# Patient Record
Sex: MALE | Race: WHITE | NOT HISPANIC OR LATINO | Employment: OTHER | URBAN - METROPOLITAN AREA
[De-identification: names, ages, dates, MRNs, and addresses within clinical notes are randomized per-mention and may not be internally consistent; named-entity substitution may affect disease eponyms.]

---

## 2020-08-15 PROBLEM — S82.839A: Status: ACTIVE | Noted: 2020-08-15
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
## 2020-08-15 PROBLEM — S82.831A CLOSED FRACTURE OF RIGHT DISTAL FIBULA: Status: ACTIVE | Noted: 2020-08-15

## 2020-08-16 PROBLEM — S82.831A CLOSED FRACTURE OF RIGHT DISTAL FIBULA: Status: RESOLVED | Noted: 2020-08-15 | Resolved: 2020-08-16

## 2020-11-25 PROBLEM — S82.831D CLOSED FRACTURE OF DISTAL END OF RIGHT FIBULA WITH ROUTINE HEALING: Status: ACTIVE | Noted: 2020-11-25

## 2021-05-04 ENCOUNTER — PATIENT MESSAGE (OUTPATIENT)
Dept: RESEARCH | Facility: HOSPITAL | Age: 56
End: 2021-05-04

## 2025-08-08 ENCOUNTER — TELEPHONE (OUTPATIENT)
Dept: OTOLARYNGOLOGY | Facility: CLINIC | Age: 60
End: 2025-08-08
Payer: COMMERCIAL

## 2025-08-08 NOTE — TELEPHONE ENCOUNTER
Copied from CRM #[id number]. Topic: General Inquiry - Return Call  >> Aug 8, 2025 11:12 AM Dania wrote:  Type:  Patient Returning Call    Who Called:patient at [phone]    Who Left Message for Patient: VENU FINK  Does the patient know what this is regarding?:appt   Additional Information: Please call patient and advise. Thank you.

## 2025-08-08 NOTE — TELEPHONE ENCOUNTER
----- Message from JAILENE Rondon sent at 8/8/2025 10:08 AM CDT -----  Regarding: RE: [facility] ER visit f/u 8/7/2025 Dx: Vertigo  You're welcome.  ----- Message -----  From: Anna Cruz LPN  Sent: 8/8/2025  10:07 AM CDT  To: Nela White RN  Subject: RE: [facility] ER visit f/u 8/7/2025 Dx: Vertigo       Great! Thanks.  ----- Message -----  From: Nela White RN  Sent: 8/8/2025  10:01 AM CDT  To: Anna Cruz LPN  Subject: RE: [facility] ER visit f/u 8/7/2025 Dx: Vertigo       Patient requested to schedule with [facility].  ----- Message -----  From: Anna Cruz LPN  Sent: 8/8/2025   9:27 AM CDT  To: Nela White RN  Subject: RE: [facility] ER visit f/u 8/7/2025 Dx: Vertigo       AVS shows:  Schedule an appointment with Williams Selby MD.  Dr. Heredia is not on call.  ----- Message -----  From: Nela White RN  Sent: 8/8/2025   9:21 AM CDT  To: [facility] Staff  Subject: [facility] ER visit f/u 8/7/2025 Dx: Vertigo           Please call patient to schedule an appointment for further evaluation/treatment of Vertigo.     Thanks,    Nela White, JAILENE, BSN  ED Navigator/Case Management  [phone]

## 2025-08-14 ENCOUNTER — OFFICE VISIT (OUTPATIENT)
Dept: OTOLARYNGOLOGY | Facility: CLINIC | Age: 60
End: 2025-08-14
Payer: COMMERCIAL

## 2025-08-14 VITALS
SYSTOLIC BLOOD PRESSURE: 141 MMHG | WEIGHT: 241.19 LBS | DIASTOLIC BLOOD PRESSURE: 88 MMHG | BODY MASS INDEX: 34.61 KG/M2

## 2025-08-14 DIAGNOSIS — R42 VERTIGO: ICD-10-CM

## 2025-08-14 DIAGNOSIS — H93.13 TINNITUS OF BOTH EARS: ICD-10-CM

## 2025-08-14 PROCEDURE — 99203 OFFICE O/P NEW LOW 30 MIN: CPT | Mod: S$GLB,,, | Performed by: NURSE PRACTITIONER

## 2025-08-14 PROCEDURE — 1159F MED LIST DOCD IN RCRD: CPT | Mod: CPTII,S$GLB,, | Performed by: NURSE PRACTITIONER

## 2025-08-14 PROCEDURE — 3008F BODY MASS INDEX DOCD: CPT | Mod: CPTII,S$GLB,, | Performed by: NURSE PRACTITIONER

## 2025-08-14 PROCEDURE — 3077F SYST BP >= 140 MM HG: CPT | Mod: CPTII,S$GLB,, | Performed by: NURSE PRACTITIONER

## 2025-08-14 PROCEDURE — 99999 PR PBB SHADOW E&M-EST. PATIENT-LVL III: CPT | Mod: PBBFAC,,, | Performed by: NURSE PRACTITIONER

## 2025-08-14 PROCEDURE — 4010F ACE/ARB THERAPY RXD/TAKEN: CPT | Mod: CPTII,S$GLB,, | Performed by: NURSE PRACTITIONER

## 2025-08-14 PROCEDURE — 3079F DIAST BP 80-89 MM HG: CPT | Mod: CPTII,S$GLB,, | Performed by: NURSE PRACTITIONER

## 2025-08-18 ENCOUNTER — CLINICAL SUPPORT (OUTPATIENT)
Dept: AUDIOLOGY | Facility: CLINIC | Age: 60
End: 2025-08-18
Payer: COMMERCIAL

## 2025-08-18 DIAGNOSIS — H90.A22 SENSORINEURAL HEARING LOSS (SNHL) OF LEFT EAR WITH RESTRICTED HEARING OF RIGHT EAR: Primary | ICD-10-CM

## 2025-08-18 DIAGNOSIS — H90.3 ASYMMETRICAL SENSORINEURAL HEARING LOSS: ICD-10-CM

## 2025-08-18 DIAGNOSIS — R42 VERTIGO: ICD-10-CM

## 2025-08-18 DIAGNOSIS — H93.13 TINNITUS OF BOTH EARS: Primary | ICD-10-CM

## 2025-08-18 DIAGNOSIS — H93.13 TINNITUS, BILATERAL: Primary | ICD-10-CM

## 2025-08-18 PROCEDURE — 92557 COMPREHENSIVE HEARING TEST: CPT | Mod: S$GLB,,, | Performed by: AUDIOLOGIST-HEARING AID FITTER

## 2025-08-18 PROCEDURE — 92567 TYMPANOMETRY: CPT | Mod: S$GLB,,, | Performed by: AUDIOLOGIST-HEARING AID FITTER

## 2025-08-18 PROCEDURE — 99999 PR PBB SHADOW E&M-EST. PATIENT-LVL I: CPT | Mod: PBBFAC,,, | Performed by: AUDIOLOGIST-HEARING AID FITTER
